# Patient Record
Sex: MALE | Race: WHITE | NOT HISPANIC OR LATINO
[De-identification: names, ages, dates, MRNs, and addresses within clinical notes are randomized per-mention and may not be internally consistent; named-entity substitution may affect disease eponyms.]

---

## 2018-02-20 ENCOUNTER — APPOINTMENT (OUTPATIENT)
Dept: UROLOGY | Facility: CLINIC | Age: 33
End: 2018-02-20
Payer: MEDICAID

## 2018-02-20 VITALS
HEART RATE: 86 BPM | HEIGHT: 69 IN | TEMPERATURE: 98.1 F | DIASTOLIC BLOOD PRESSURE: 76 MMHG | SYSTOLIC BLOOD PRESSURE: 123 MMHG | WEIGHT: 180 LBS | RESPIRATION RATE: 17 BRPM | BODY MASS INDEX: 26.66 KG/M2

## 2018-02-20 DIAGNOSIS — Z84.1 FAMILY HISTORY OF DISORDERS OF KIDNEY AND URETER: ICD-10-CM

## 2018-02-20 DIAGNOSIS — Z86.69 PERSONAL HISTORY OF OTHER DISEASES OF THE NERVOUS SYSTEM AND SENSE ORGANS: ICD-10-CM

## 2018-02-20 DIAGNOSIS — Z87.891 PERSONAL HISTORY OF NICOTINE DEPENDENCE: ICD-10-CM

## 2018-02-20 PROCEDURE — 99203 OFFICE O/P NEW LOW 30 MIN: CPT

## 2018-02-20 RX ORDER — OCRELIZUMAB 300 MG/10ML
300 INJECTION INTRAVENOUS
Refills: 0 | Status: ACTIVE | COMMUNITY

## 2018-03-13 ENCOUNTER — MESSAGE (OUTPATIENT)
Age: 33
End: 2018-03-13

## 2018-04-30 ENCOUNTER — APPOINTMENT (OUTPATIENT)
Dept: UROLOGY | Facility: HOSPITAL | Age: 33
End: 2018-04-30

## 2018-06-12 ENCOUNTER — MESSAGE (OUTPATIENT)
Age: 33
End: 2018-06-12

## 2018-06-21 ENCOUNTER — OTHER (OUTPATIENT)
Age: 33
End: 2018-06-21

## 2018-07-12 ENCOUNTER — MESSAGE (OUTPATIENT)
Age: 33
End: 2018-07-12

## 2018-07-30 ENCOUNTER — APPOINTMENT (OUTPATIENT)
Dept: UROLOGY | Facility: HOSPITAL | Age: 33
End: 2018-07-30

## 2018-07-31 ENCOUNTER — APPOINTMENT (OUTPATIENT)
Dept: INTERVENTIONAL RADIOLOGY/VASCULAR | Facility: CLINIC | Age: 33
End: 2018-07-31
Payer: MEDICAID

## 2018-07-31 ENCOUNTER — APPOINTMENT (OUTPATIENT)
Dept: UROLOGY | Facility: CLINIC | Age: 33
End: 2018-07-31
Payer: MEDICAID

## 2018-07-31 VITALS — BODY MASS INDEX: 26.5 KG/M2 | WEIGHT: 183 LBS | HEIGHT: 69.5 IN

## 2018-07-31 VITALS
HEIGHT: 69 IN | RESPIRATION RATE: 16 BRPM | DIASTOLIC BLOOD PRESSURE: 77 MMHG | SYSTOLIC BLOOD PRESSURE: 115 MMHG | HEART RATE: 77 BPM | OXYGEN SATURATION: 97 %

## 2018-07-31 PROCEDURE — 99214 OFFICE O/P EST MOD 30 MIN: CPT

## 2018-07-31 PROCEDURE — 99244 OFF/OP CNSLTJ NEW/EST MOD 40: CPT

## 2019-01-08 ENCOUNTER — APPOINTMENT (OUTPATIENT)
Dept: UROLOGY | Facility: CLINIC | Age: 34
End: 2019-01-08
Payer: SELF-PAY

## 2019-01-08 ENCOUNTER — OUTPATIENT (OUTPATIENT)
Dept: OUTPATIENT SERVICES | Facility: HOSPITAL | Age: 34
LOS: 1 days | End: 2019-01-08

## 2019-01-08 VITALS
HEART RATE: 73 BPM | DIASTOLIC BLOOD PRESSURE: 80 MMHG | TEMPERATURE: 97 F | HEIGHT: 68 IN | OXYGEN SATURATION: 97 % | WEIGHT: 184.97 LBS | SYSTOLIC BLOOD PRESSURE: 110 MMHG | RESPIRATION RATE: 18 BRPM

## 2019-01-08 DIAGNOSIS — N28.89 OTHER SPECIFIED DISORDERS OF KIDNEY AND URETER: ICD-10-CM

## 2019-01-08 DIAGNOSIS — Z00.00 ENCOUNTER FOR GENERAL ADULT MEDICAL EXAMINATION W/OUT ABNORMAL FINDINGS: ICD-10-CM

## 2019-01-08 DIAGNOSIS — Z98.890 OTHER SPECIFIED POSTPROCEDURAL STATES: Chronic | ICD-10-CM

## 2019-01-08 DIAGNOSIS — N28.9 DISORDER OF KIDNEY AND URETER, UNSPECIFIED: ICD-10-CM

## 2019-01-08 LAB
ANION GAP SERPL CALC-SCNC: 13 MEQ/L — SIGNIFICANT CHANGE UP (ref 7–14)
APPEARANCE UR: CLEAR — SIGNIFICANT CHANGE UP
BILIRUB UR-MCNC: NEGATIVE — SIGNIFICANT CHANGE UP
BLD GP AB SCN SERPL QL: NEGATIVE — SIGNIFICANT CHANGE UP
BLOOD UR QL VISUAL: NEGATIVE — SIGNIFICANT CHANGE UP
BUN SERPL-MCNC: 15 MG/DL — SIGNIFICANT CHANGE UP (ref 7–23)
CALCIUM SERPL-MCNC: 9.3 MG/DL — SIGNIFICANT CHANGE UP (ref 8.4–10.5)
CHLORIDE SERPL-SCNC: 100 MMOL/L — SIGNIFICANT CHANGE UP (ref 98–107)
CO2 SERPL-SCNC: 25 MMOL/L — SIGNIFICANT CHANGE UP (ref 22–31)
COLOR SPEC: SIGNIFICANT CHANGE UP
CREAT SERPL-MCNC: 0.89 MG/DL — SIGNIFICANT CHANGE UP (ref 0.5–1.3)
GLUCOSE SERPL-MCNC: 94 MG/DL — SIGNIFICANT CHANGE UP (ref 70–99)
GLUCOSE UR-MCNC: NEGATIVE — SIGNIFICANT CHANGE UP
HCT VFR BLD CALC: 43.2 % — SIGNIFICANT CHANGE UP (ref 39–50)
HGB BLD-MCNC: 14.8 G/DL — SIGNIFICANT CHANGE UP (ref 13–17)
KETONES UR-MCNC: NEGATIVE — SIGNIFICANT CHANGE UP
LEUKOCYTE ESTERASE UR-ACNC: NEGATIVE — SIGNIFICANT CHANGE UP
MCHC RBC-ENTMCNC: 29.3 PG — SIGNIFICANT CHANGE UP (ref 27–34)
MCHC RBC-ENTMCNC: 34.3 % — SIGNIFICANT CHANGE UP (ref 32–36)
MCV RBC AUTO: 85.5 FL — SIGNIFICANT CHANGE UP (ref 80–100)
NITRITE UR-MCNC: NEGATIVE — SIGNIFICANT CHANGE UP
NRBC # FLD: 0 K/UL — LOW (ref 25–125)
PH UR: 6 — SIGNIFICANT CHANGE UP (ref 5–8)
PLATELET # BLD AUTO: 286 K/UL — SIGNIFICANT CHANGE UP (ref 150–400)
PMV BLD: 10.6 FL — SIGNIFICANT CHANGE UP (ref 7–13)
POTASSIUM SERPL-MCNC: 3.4 MMOL/L — LOW (ref 3.5–5.3)
POTASSIUM SERPL-SCNC: 3.4 MMOL/L — LOW (ref 3.5–5.3)
PROT UR-MCNC: NEGATIVE — SIGNIFICANT CHANGE UP
RBC # BLD: 5.05 M/UL — SIGNIFICANT CHANGE UP (ref 4.2–5.8)
RBC # FLD: 12.9 % — SIGNIFICANT CHANGE UP (ref 10.3–14.5)
RH IG SCN BLD-IMP: NEGATIVE — SIGNIFICANT CHANGE UP
SODIUM SERPL-SCNC: 138 MMOL/L — SIGNIFICANT CHANGE UP (ref 135–145)
SP GR SPEC: 1.02 — SIGNIFICANT CHANGE UP (ref 1–1.04)
UROBILINOGEN FLD QL: NORMAL — SIGNIFICANT CHANGE UP
WBC # BLD: 7.01 K/UL — SIGNIFICANT CHANGE UP (ref 3.8–10.5)
WBC # FLD AUTO: 7.01 K/UL — SIGNIFICANT CHANGE UP (ref 3.8–10.5)

## 2019-01-08 PROCEDURE — 99214 OFFICE O/P EST MOD 30 MIN: CPT

## 2019-01-08 NOTE — H&P PST ADULT - GASTROINTESTINAL DETAILS
no rebound tenderness/no rigidity/no organomegaly/no masses palpable/no distention/bowel sounds normal/no bruit/no guarding/nontender/soft

## 2019-01-08 NOTE — H&P PST ADULT - PROBLEM SELECTOR PLAN 1
Left Laparoscopic Partial Nephrectomy, Possible Open, Possible Radical on 01/21/19.  Lab results pending.  Preop, famotidine and chlorhexidine instructions provided and questions addressed.

## 2019-01-08 NOTE — H&P PST ADULT - FAMILY HISTORY
Father  Still living? Yes, Estimated age: Age Unknown  Family history of cardiac disorder in father, Age at diagnosis: Age Unknown     Sibling  Still living? Yes, Estimated age: Age Unknown  Family history of diabetes mellitus in brother, Age at diagnosis: Age Unknown  Family history of hypertension in brother, Age at diagnosis: Age Unknown

## 2019-01-08 NOTE — H&P PST ADULT - HISTORY OF PRESENT ILLNESS
33 yr old male with recent dx of Multiple Sclerosis presents for preop evaluation with dx of Other Specified Disorders of Kidney and Ureter.  Pt went for work-up for MS and incidentally disorder was found on MRI.  Pt is now scheduled for Left Laparoscopic Partial Nephrectomy, Possible Open, Possible Radical on 01/21/19.

## 2019-01-08 NOTE — H&P PST ADULT - NEGATIVE OPHTHALMOLOGIC SYMPTOMS
no lacrimation L/no lacrimation R/no blurred vision L/no blurred vision R/no discharge R/no discharge L

## 2019-01-08 NOTE — ASSESSMENT
[FreeTextEntry1] : This is a 33 year old male with a left renal mass planned for partial nephrectomy on 1/21/18\par \par - pre-operative counseling completed\par - PST today\par - call with questions or concerns, will send reports from his neurologist regarding MRI results

## 2019-01-08 NOTE — H&P PST ADULT - NEGATIVE GASTROINTESTINAL SYMPTOMS
no nausea/no change in bowel habits/no melena/no hematochezia/no jaundice/no hiccoughs/no constipation/no vomiting/no abdominal pain/no steatorrhea/no diarrhea

## 2019-01-08 NOTE — HISTORY OF PRESENT ILLNESS
[FreeTextEntry1] : This is a 33 year old male with a 2.5 cm left renal mass presenting for pre-operative counseling prior to partial nephrectomy. \par \par In the interval, he has been treated with DMARD (Rituxamab) infusions for MS.\par \par NO fevers, chills, nausea, vomiting, or difficulty voiding.\par \par

## 2019-01-10 LAB
BACTERIA UR CULT: SIGNIFICANT CHANGE UP
SPECIMEN SOURCE: SIGNIFICANT CHANGE UP

## 2019-01-20 ENCOUNTER — TRANSCRIPTION ENCOUNTER (OUTPATIENT)
Age: 34
End: 2019-01-20

## 2019-01-21 ENCOUNTER — APPOINTMENT (OUTPATIENT)
Dept: UROLOGY | Facility: HOSPITAL | Age: 34
End: 2019-01-21

## 2019-01-21 ENCOUNTER — INPATIENT (INPATIENT)
Facility: HOSPITAL | Age: 34
LOS: 1 days | Discharge: HOME | End: 2019-01-23
Attending: UROLOGY | Admitting: UROLOGY
Payer: COMMERCIAL

## 2019-01-21 ENCOUNTER — RESULT REVIEW (OUTPATIENT)
Age: 34
End: 2019-01-21

## 2019-01-21 VITALS
HEART RATE: 80 BPM | OXYGEN SATURATION: 98 % | TEMPERATURE: 99 F | WEIGHT: 184.97 LBS | DIASTOLIC BLOOD PRESSURE: 69 MMHG | SYSTOLIC BLOOD PRESSURE: 125 MMHG | RESPIRATION RATE: 16 BRPM | HEIGHT: 68 IN

## 2019-01-21 DIAGNOSIS — N28.89 OTHER SPECIFIED DISORDERS OF KIDNEY AND URETER: ICD-10-CM

## 2019-01-21 DIAGNOSIS — Z98.890 OTHER SPECIFIED POSTPROCEDURAL STATES: Chronic | ICD-10-CM

## 2019-01-21 DIAGNOSIS — Z29.9 ENCOUNTER FOR PROPHYLACTIC MEASURES, UNSPECIFIED: ICD-10-CM

## 2019-01-21 DIAGNOSIS — F41.9 ANXIETY DISORDER, UNSPECIFIED: ICD-10-CM

## 2019-01-21 DIAGNOSIS — G35 MULTIPLE SCLEROSIS: ICD-10-CM

## 2019-01-21 LAB
ANION GAP SERPL CALC-SCNC: 12 MMO/L — SIGNIFICANT CHANGE UP (ref 7–14)
BASOPHILS # BLD AUTO: 0.05 K/UL — SIGNIFICANT CHANGE UP (ref 0–0.2)
BASOPHILS NFR BLD AUTO: 0.3 % — SIGNIFICANT CHANGE UP (ref 0–2)
BUN SERPL-MCNC: 9 MG/DL — SIGNIFICANT CHANGE UP (ref 7–23)
CALCIUM SERPL-MCNC: 9 MG/DL — SIGNIFICANT CHANGE UP (ref 8.4–10.5)
CHLORIDE SERPL-SCNC: 105 MMOL/L — SIGNIFICANT CHANGE UP (ref 98–107)
CO2 SERPL-SCNC: 24 MMOL/L — SIGNIFICANT CHANGE UP (ref 22–31)
CREAT SERPL-MCNC: 1.07 MG/DL — SIGNIFICANT CHANGE UP (ref 0.5–1.3)
EOSINOPHIL # BLD AUTO: 0.04 K/UL — SIGNIFICANT CHANGE UP (ref 0–0.5)
EOSINOPHIL NFR BLD AUTO: 0.2 % — SIGNIFICANT CHANGE UP (ref 0–6)
GLUCOSE SERPL-MCNC: 145 MG/DL — HIGH (ref 70–99)
HCT VFR BLD CALC: 41.8 % — SIGNIFICANT CHANGE UP (ref 39–50)
HGB BLD-MCNC: 14 G/DL — SIGNIFICANT CHANGE UP (ref 13–17)
IMM GRANULOCYTES NFR BLD AUTO: 0.5 % — SIGNIFICANT CHANGE UP (ref 0–1.5)
LYMPHOCYTES # BLD AUTO: 0.65 K/UL — LOW (ref 1–3.3)
LYMPHOCYTES # BLD AUTO: 4 % — LOW (ref 13–44)
MCHC RBC-ENTMCNC: 29 PG — SIGNIFICANT CHANGE UP (ref 27–34)
MCHC RBC-ENTMCNC: 33.5 % — SIGNIFICANT CHANGE UP (ref 32–36)
MCV RBC AUTO: 86.5 FL — SIGNIFICANT CHANGE UP (ref 80–100)
MONOCYTES # BLD AUTO: 0.41 K/UL — SIGNIFICANT CHANGE UP (ref 0–0.9)
MONOCYTES NFR BLD AUTO: 2.5 % — SIGNIFICANT CHANGE UP (ref 2–14)
NEUTROPHILS # BLD AUTO: 15.09 K/UL — HIGH (ref 1.8–7.4)
NEUTROPHILS NFR BLD AUTO: 92.5 % — HIGH (ref 43–77)
NRBC # FLD: 0 K/UL — LOW (ref 25–125)
PLATELET # BLD AUTO: 271 K/UL — SIGNIFICANT CHANGE UP (ref 150–400)
PMV BLD: 10.1 FL — SIGNIFICANT CHANGE UP (ref 7–13)
POTASSIUM BLDV-SCNC: 3.9 MMOL/L — SIGNIFICANT CHANGE UP (ref 3.4–4.5)
POTASSIUM SERPL-MCNC: 4.2 MMOL/L — SIGNIFICANT CHANGE UP (ref 3.5–5.3)
POTASSIUM SERPL-SCNC: 4.2 MMOL/L — SIGNIFICANT CHANGE UP (ref 3.5–5.3)
RBC # BLD: 4.83 M/UL — SIGNIFICANT CHANGE UP (ref 4.2–5.8)
RBC # FLD: 13.1 % — SIGNIFICANT CHANGE UP (ref 10.3–14.5)
RH IG SCN BLD-IMP: NEGATIVE — SIGNIFICANT CHANGE UP
SODIUM SERPL-SCNC: 141 MMOL/L — SIGNIFICANT CHANGE UP (ref 135–145)
WBC # BLD: 16.32 K/UL — HIGH (ref 3.8–10.5)
WBC # FLD AUTO: 16.32 K/UL — HIGH (ref 3.8–10.5)

## 2019-01-21 PROCEDURE — 88307 TISSUE EXAM BY PATHOLOGIST: CPT | Mod: 26

## 2019-01-21 PROCEDURE — 88312 SPECIAL STAINS GROUP 1: CPT | Mod: 26

## 2019-01-21 PROCEDURE — 88331 PATH CONSLTJ SURG 1 BLK 1SPC: CPT | Mod: 26

## 2019-01-21 PROCEDURE — 88305 TISSUE EXAM BY PATHOLOGIST: CPT | Mod: 26

## 2019-01-21 PROCEDURE — 99223 1ST HOSP IP/OBS HIGH 75: CPT

## 2019-01-21 PROCEDURE — 50543 LAPARO PARTIAL NEPHRECTOMY: CPT | Mod: LT

## 2019-01-21 PROCEDURE — 76998 US GUIDE INTRAOP: CPT | Mod: 26

## 2019-01-21 RX ORDER — SENNA PLUS 8.6 MG/1
2 TABLET ORAL AT BEDTIME
Qty: 0 | Refills: 0 | Status: DISCONTINUED | OUTPATIENT
Start: 2019-01-21 | End: 2019-01-23

## 2019-01-21 RX ORDER — HYDROMORPHONE HYDROCHLORIDE 2 MG/ML
0.5 INJECTION INTRAMUSCULAR; INTRAVENOUS; SUBCUTANEOUS
Qty: 0 | Refills: 0 | Status: DISCONTINUED | OUTPATIENT
Start: 2019-01-21 | End: 2019-01-21

## 2019-01-21 RX ORDER — OXYCODONE AND ACETAMINOPHEN 5; 325 MG/1; MG/1
1 TABLET ORAL EVERY 4 HOURS
Qty: 0 | Refills: 0 | Status: DISCONTINUED | OUTPATIENT
Start: 2019-01-21 | End: 2019-01-23

## 2019-01-21 RX ORDER — KETOROLAC TROMETHAMINE 30 MG/ML
15 SYRINGE (ML) INJECTION EVERY 6 HOURS
Qty: 0 | Refills: 0 | Status: DISCONTINUED | OUTPATIENT
Start: 2019-01-21 | End: 2019-01-23

## 2019-01-21 RX ORDER — OXYCODONE AND ACETAMINOPHEN 5; 325 MG/1; MG/1
2 TABLET ORAL EVERY 6 HOURS
Qty: 0 | Refills: 0 | Status: DISCONTINUED | OUTPATIENT
Start: 2019-01-21 | End: 2019-01-23

## 2019-01-21 RX ORDER — DOCUSATE SODIUM 100 MG
100 CAPSULE ORAL THREE TIMES A DAY
Qty: 0 | Refills: 0 | Status: DISCONTINUED | OUTPATIENT
Start: 2019-01-21 | End: 2019-01-23

## 2019-01-21 RX ORDER — SODIUM CHLORIDE 9 MG/ML
1000 INJECTION, SOLUTION INTRAVENOUS
Qty: 0 | Refills: 0 | Status: DISCONTINUED | OUTPATIENT
Start: 2019-01-21 | End: 2019-01-21

## 2019-01-21 RX ORDER — ONDANSETRON 8 MG/1
4 TABLET, FILM COATED ORAL ONCE
Qty: 0 | Refills: 0 | Status: DISCONTINUED | OUTPATIENT
Start: 2019-01-21 | End: 2019-01-21

## 2019-01-21 RX ORDER — OCRELIZUMAB 300 MG/10ML
0 INJECTION INTRAVENOUS
Qty: 0 | Refills: 0 | COMMUNITY

## 2019-01-21 RX ORDER — HEPARIN SODIUM 5000 [USP'U]/ML
5000 INJECTION INTRAVENOUS; SUBCUTANEOUS EVERY 8 HOURS
Qty: 0 | Refills: 0 | Status: DISCONTINUED | OUTPATIENT
Start: 2019-01-21 | End: 2019-01-23

## 2019-01-21 RX ORDER — SODIUM CHLORIDE 9 MG/ML
1000 INJECTION, SOLUTION INTRAVENOUS
Qty: 0 | Refills: 0 | Status: DISCONTINUED | OUTPATIENT
Start: 2019-01-21 | End: 2019-01-22

## 2019-01-21 RX ADMIN — HYDROMORPHONE HYDROCHLORIDE 0.5 MILLIGRAM(S): 2 INJECTION INTRAMUSCULAR; INTRAVENOUS; SUBCUTANEOUS at 15:00

## 2019-01-21 RX ADMIN — SODIUM CHLORIDE 125 MILLILITER(S): 9 INJECTION, SOLUTION INTRAVENOUS at 18:40

## 2019-01-21 RX ADMIN — Medication 15 MILLIGRAM(S): at 18:40

## 2019-01-21 RX ADMIN — OXYCODONE AND ACETAMINOPHEN 1 TABLET(S): 5; 325 TABLET ORAL at 23:55

## 2019-01-21 RX ADMIN — Medication 100 MILLIGRAM(S): at 21:45

## 2019-01-21 RX ADMIN — HEPARIN SODIUM 5000 UNIT(S): 5000 INJECTION INTRAVENOUS; SUBCUTANEOUS at 15:25

## 2019-01-21 RX ADMIN — HEPARIN SODIUM 5000 UNIT(S): 5000 INJECTION INTRAVENOUS; SUBCUTANEOUS at 21:45

## 2019-01-21 RX ADMIN — OXYCODONE AND ACETAMINOPHEN 1 TABLET(S): 5; 325 TABLET ORAL at 22:57

## 2019-01-21 RX ADMIN — HYDROMORPHONE HYDROCHLORIDE 0.5 MILLIGRAM(S): 2 INJECTION INTRAMUSCULAR; INTRAVENOUS; SUBCUTANEOUS at 14:47

## 2019-01-21 RX ADMIN — SENNA PLUS 2 TABLET(S): 8.6 TABLET ORAL at 21:45

## 2019-01-21 RX ADMIN — HYDROMORPHONE HYDROCHLORIDE 0.5 MILLIGRAM(S): 2 INJECTION INTRAMUSCULAR; INTRAVENOUS; SUBCUTANEOUS at 14:30

## 2019-01-21 NOTE — BRIEF OPERATIVE NOTE - CONDITION POST OP
Pt called by writer. Home phone is out of service. Pt is unavailable at work number. Pt called on cell phone. Mailbox is full, unable to leave message. Pantry message sent to pt to go to ER as directed as she will need IV antibiotics to avoid becoming septic. Message routed to Dr. Hernandez.    Stable

## 2019-01-21 NOTE — CONSULT NOTE ADULT - PROBLEM SELECTOR RECOMMENDATION 9
-s/p left lap partial nephrectomy 1/21  -postop management per , pain control, IVF, incentive spirometry, DVT ppx, on clears, await GI fxn

## 2019-01-21 NOTE — CONSULT NOTE ADULT - SUBJECTIVE AND OBJECTIVE BOX
Mehreen Elena MD  Pager [phone]    HPI:  33 yr old male with recent dx of anxiety, Multiple Sclerosis, L renal mass, admitted for surgery. Patient underwent left lap partial nephrectomy today. Currently in pacu, hemodynamically stable, c/o incisional pain and left shoulder discomfort.  Denies chest pain/sob.    PAST MEDICAL & SURGICAL HISTORY:  Anxiety  Disorder of kidney and ureter, unspecified  Multiple sclerosis  H/O inguinal hernia repair: @ 1yr old    Review of Systems:   CONSTITUTIONAL: No fever, weight loss, or fatigue  EYES: No eye pain, visual disturbances, or discharge  ENMT:  No difficulty hearing, tinnitus, vertigo; No sinus or throat pain  NECK: No pain or stiffness  BREASTS: No pain, masses, or nipple discharge  RESPIRATORY: No cough, wheezing, chills or hemoptysis; No shortness of breath  CARDIOVASCULAR: No chest pain, palpitations, dizziness, or leg swelling  GASTROINTESTINAL: No abdominal or epigastric pain. No nausea, vomiting, or hematemesis; No diarrhea or constipation. No melena or hematochezia.  GENITOURINARY: No dysuria, frequency, hematuria, or incontinence  NEUROLOGICAL: No headaches, memory loss, loss of strength, numbness, or tremors  SKIN: No itching, burning, rashes, or lesions   LYMPH NODES: No enlarged glands  ENDOCRINE: No heat or cold intolerance; No hair loss  MUSCULOSKELETAL: No joint pain or swelling; No muscle, back, or extremity pain  PSYCHIATRIC: No depression, anxiety, mood swings, or difficulty sleeping  HEME/LYMPH: No easy bruising, or bleeding gums  ALLERY AND IMMUNOLOGIC: No hives or eczema    Allergies    No Known Allergies    Intolerances      Social History:  former smoker 1  ppd x15 years, quit 2007, drinks wine 1-2 drinks on weekends    FAMILY HISTORY:  Family history of hypertension in brother (Sibling)  Family history of diabetes mellitus in brother (Sibling)  Family history of cardiac disorder in father (Father): arrythmia      Home Medications:  Ocrevus: every 6 months for MS (21 Jan 2019 10:01)  vitamin d: 1 tab(s) orally once a day (at bedtime) (21 Jan 2019 10:01)      MEDICATIONS  (STANDING):  heparin  Injectable 5000 Unit(s) SubCutaneous every 8 hours  ketorolac   Injectable 15 milliGRAM(s) IV Push every 6 hours  lactated ringers. 1000 milliLiter(s) (125 mL/Hr) IV Continuous <Continuous>    MEDICATIONS  (PRN):  HYDROmorphone  Injectable 0.5 milliGRAM(s) IV Push every 10 minutes PRN Severe Pain (7 - 10)  ondansetron  IVPB 4 milliGRAM(s) IV Intermittent once PRN Nausea and/or Vomiting  oxyCODONE    5 mG/acetaminophen 325 mG 1 Tablet(s) Oral every 4 hours PRN Moderate Pain (4 - 6)  oxyCODONE    5 mG/acetaminophen 325 mG 2 Tablet(s) Oral every 6 hours PRN Severe Pain (7 - 10)      Vital Signs Last 24 Hrs  T(C): 36.4 (21 Jan 2019 13:45), Max: 37.1 (21 Jan 2019 09:22)  T(F): 97.5 (21 Jan 2019 13:45), Max: 98.8 (21 Jan 2019 09:22)  HR: 80 (21 Jan 2019 14:30) (73 - 85)  BP: 99/84 (21 Jan 2019 14:30) (99/84 - 127/71)  BP(mean): 87 (21 Jan 2019 14:30) (59 - 87)  RR: 16 (21 Jan 2019 14:30) (10 - 21)  SpO2: 96% (21 Jan 2019 14:30) (95% - 99%)  CAPILLARY BLOOD GLUCOSE        I&O's Summary    21 Jan 2019 07:01  -  21 Jan 2019 14:39  --------------------------------------------------------  IN: 0 mL / OUT: 125 mL / NET: -125 mL        PHYSICAL EXAM:  GENERAL: NAD, well-developed  HEAD:  Atraumatic, Normocephalic  EYES: EOMI, PERRLA, conjunctiva and sclera clear  NECK: Supple, No JVD  CHEST/LUNG: Clear to auscultation bilaterally; No wheeze  HEART: Regular rate and rhythm; No murmurs, rubs, or gallops  ABDOMEN: Soft, incisional tenderness, nondistended  : house  EXTREMITIES:  2+ Peripheral Pulses, No clubbing, cyanosis, or edema  PSYCH: AAOx3  NEUROLOGY: non-focal  SKIN: No rashes or lesions    LABS:                  Microbiology     RADIOLOGY & ADDITIONAL TESTS:    Imaging Personally Reviewed:    Consultant(s) Notes Reviewed:      Care Discussed with Consultants/Other Providers:

## 2019-01-21 NOTE — CONSULT NOTE ADULT - PROBLEM SELECTOR RECOMMENDATION 3
-gets anti-CD20 monoclonal antibody (Ocrelizumab = Ocrevus) infusion every 6 months for primary progressive MS, f/u neurologist at [facility]  -stable, outpt f/u

## 2019-01-21 NOTE — BRIEF OPERATIVE NOTE - PROCEDURE
<<-----Click on this checkbox to enter Procedure Laparoscopic partial nephrectomy  01/21/2019  Left  Active  [id number]

## 2019-01-22 LAB
ANION GAP SERPL CALC-SCNC: 13 MMO/L — SIGNIFICANT CHANGE UP (ref 7–14)
BUN SERPL-MCNC: 10 MG/DL — SIGNIFICANT CHANGE UP (ref 7–23)
CALCIUM SERPL-MCNC: 8.8 MG/DL — SIGNIFICANT CHANGE UP (ref 8.4–10.5)
CHLORIDE SERPL-SCNC: 102 MMOL/L — SIGNIFICANT CHANGE UP (ref 98–107)
CO2 SERPL-SCNC: 25 MMOL/L — SIGNIFICANT CHANGE UP (ref 22–31)
CREAT SERPL-MCNC: 1.08 MG/DL — SIGNIFICANT CHANGE UP (ref 0.5–1.3)
GLUCOSE SERPL-MCNC: 116 MG/DL — HIGH (ref 70–99)
HCT VFR BLD CALC: 40.5 % — SIGNIFICANT CHANGE UP (ref 39–50)
HGB BLD-MCNC: 13.6 G/DL — SIGNIFICANT CHANGE UP (ref 13–17)
MCHC RBC-ENTMCNC: 28.7 PG — SIGNIFICANT CHANGE UP (ref 27–34)
MCHC RBC-ENTMCNC: 33.6 % — SIGNIFICANT CHANGE UP (ref 32–36)
MCV RBC AUTO: 85.4 FL — SIGNIFICANT CHANGE UP (ref 80–100)
NRBC # FLD: 0 K/UL — LOW (ref 25–125)
PLATELET # BLD AUTO: 290 K/UL — SIGNIFICANT CHANGE UP (ref 150–400)
PMV BLD: 10.3 FL — SIGNIFICANT CHANGE UP (ref 7–13)
POTASSIUM SERPL-MCNC: 3.9 MMOL/L — SIGNIFICANT CHANGE UP (ref 3.5–5.3)
POTASSIUM SERPL-SCNC: 3.9 MMOL/L — SIGNIFICANT CHANGE UP (ref 3.5–5.3)
RBC # BLD: 4.74 M/UL — SIGNIFICANT CHANGE UP (ref 4.2–5.8)
RBC # FLD: 13.1 % — SIGNIFICANT CHANGE UP (ref 10.3–14.5)
RH IG SCN BLD-IMP: NEGATIVE — SIGNIFICANT CHANGE UP
SODIUM SERPL-SCNC: 140 MMOL/L — SIGNIFICANT CHANGE UP (ref 135–145)
WBC # BLD: 13.75 K/UL — HIGH (ref 3.8–10.5)
WBC # FLD AUTO: 13.75 K/UL — HIGH (ref 3.8–10.5)

## 2019-01-22 PROCEDURE — 99233 SBSQ HOSP IP/OBS HIGH 50: CPT

## 2019-01-22 PROCEDURE — 93010 ELECTROCARDIOGRAM REPORT: CPT

## 2019-01-22 RX ORDER — PANTOPRAZOLE SODIUM 20 MG/1
40 TABLET, DELAYED RELEASE ORAL
Qty: 0 | Refills: 0 | Status: DISCONTINUED | OUTPATIENT
Start: 2019-01-22 | End: 2019-01-23

## 2019-01-22 RX ORDER — SODIUM CHLORIDE 9 MG/ML
1000 INJECTION, SOLUTION INTRAVENOUS
Qty: 0 | Refills: 0 | Status: DISCONTINUED | OUTPATIENT
Start: 2019-01-22 | End: 2019-01-23

## 2019-01-22 RX ADMIN — Medication 100 MILLIGRAM(S): at 22:28

## 2019-01-22 RX ADMIN — Medication 15 MILLIGRAM(S): at 18:26

## 2019-01-22 RX ADMIN — SENNA PLUS 2 TABLET(S): 8.6 TABLET ORAL at 22:28

## 2019-01-22 RX ADMIN — OXYCODONE AND ACETAMINOPHEN 1 TABLET(S): 5; 325 TABLET ORAL at 04:00

## 2019-01-22 RX ADMIN — Medication 15 MILLIGRAM(S): at 06:29

## 2019-01-22 RX ADMIN — HEPARIN SODIUM 5000 UNIT(S): 5000 INJECTION INTRAVENOUS; SUBCUTANEOUS at 06:30

## 2019-01-22 RX ADMIN — Medication 100 MILLIGRAM(S): at 13:14

## 2019-01-22 RX ADMIN — OXYCODONE AND ACETAMINOPHEN 1 TABLET(S): 5; 325 TABLET ORAL at 03:01

## 2019-01-22 RX ADMIN — PANTOPRAZOLE SODIUM 40 MILLIGRAM(S): 20 TABLET, DELAYED RELEASE ORAL at 12:45

## 2019-01-22 RX ADMIN — SODIUM CHLORIDE 125 MILLILITER(S): 9 INJECTION, SOLUTION INTRAVENOUS at 07:05

## 2019-01-22 RX ADMIN — Medication 10 MILLIGRAM(S): at 07:07

## 2019-01-22 RX ADMIN — Medication 15 MILLIGRAM(S): at 00:09

## 2019-01-22 RX ADMIN — HEPARIN SODIUM 5000 UNIT(S): 5000 INJECTION INTRAVENOUS; SUBCUTANEOUS at 22:28

## 2019-01-22 RX ADMIN — Medication 100 MILLIGRAM(S): at 06:29

## 2019-01-22 RX ADMIN — Medication 15 MILLIGRAM(S): at 12:45

## 2019-01-22 RX ADMIN — SODIUM CHLORIDE 75 MILLILITER(S): 9 INJECTION, SOLUTION INTRAVENOUS at 08:18

## 2019-01-22 RX ADMIN — HEPARIN SODIUM 5000 UNIT(S): 5000 INJECTION INTRAVENOUS; SUBCUTANEOUS at 13:14

## 2019-01-22 NOTE — PHYSICAL THERAPY INITIAL EVALUATION ADULT - ADDITIONAL COMMENTS
Patient reports he lives with his wife in a private house, 4-6 steps to enter and 1 flight within. Patient reports he was previously independent in all ADLs and did not use an assistive device for ambulation.     Patient was left sitting in chair as found, all lines/tubes intact and call rodriguez within reach, FELTON melendez

## 2019-01-22 NOTE — PHYSICAL THERAPY INITIAL EVALUATION ADULT - ASR EQUIP NEEDS DISCH PT EVAL
rolling walker (5 inch wheels)/Patient may benefit from a rolling walker for safe ambulation; patient "thinking about it". Discussed at length the benefits of using a rolling walker for safe ambulation upon d/c home.

## 2019-01-22 NOTE — CHART NOTE - NSCHARTNOTEFT_GEN_A_CORE
Pt re-evaluated at 12:30PM and at 4:00Pm. No more Chest pain/ pressure. Vitals stable.  EKG showed NSR, no ST, TW changes. Pt still anxious, but still refusing Xanax.   Will continue to monitor. Pt re-evaluated at 12:40PM and at 4:00Pm. No more Chest pain/ pressure. Vitals stable.  EKG showed NSR, no ST, TW changes. Pt still anxious, but still refusing Xanax.   Will continue to monitor.

## 2019-01-22 NOTE — PHYSICAL THERAPY INITIAL EVALUATION ADULT - PATIENT PROFILE REVIEW, REHAB EVAL
yes/PT orders received--> ambulate as tolerated. Consult with FELTON ADAIR-->pt OK to participate in PT evaluation.

## 2019-01-22 NOTE — PHYSICAL THERAPY INITIAL EVALUATION ADULT - GAIT DEVIATIONS NOTED, PT EVAL
decreased step length/decreased shanti/decreased velocity of limb motion/Patient noted with left LE footdrop/increased time in double stance/decreased stride length/footdrop

## 2019-01-22 NOTE — PHYSICAL THERAPY INITIAL EVALUATION ADULT - PERTINENT HX OF CURRENT PROBLEM, REHAB EVAL
Patient is a 33 year old male admitted to [facility] on 1/21 for preop evaluation with diagnosis of Other Specified Disorders of Kidney and Ureter. PMH includes: recent diagnosis of Multiple Sclerosis.

## 2019-01-23 ENCOUNTER — TRANSCRIPTION ENCOUNTER (OUTPATIENT)
Age: 34
End: 2019-01-23

## 2019-01-23 VITALS
RESPIRATION RATE: 17 BRPM | HEART RATE: 73 BPM | OXYGEN SATURATION: 100 % | DIASTOLIC BLOOD PRESSURE: 74 MMHG | TEMPERATURE: 98 F | SYSTOLIC BLOOD PRESSURE: 117 MMHG

## 2019-01-23 PROCEDURE — 99233 SBSQ HOSP IP/OBS HIGH 50: CPT

## 2019-01-23 RX ORDER — SENNA PLUS 8.6 MG/1
2 TABLET ORAL
Qty: 0 | Refills: 0 | COMMUNITY
Start: 2019-01-23

## 2019-01-23 RX ORDER — DOCUSATE SODIUM 100 MG
1 CAPSULE ORAL
Qty: 0 | Refills: 0 | COMMUNITY
Start: 2019-01-23

## 2019-01-23 RX ADMIN — Medication 15 MILLIGRAM(S): at 06:59

## 2019-01-23 RX ADMIN — Medication 15 MILLIGRAM(S): at 12:16

## 2019-01-23 RX ADMIN — Medication 15 MILLIGRAM(S): at 00:35

## 2019-01-23 RX ADMIN — PANTOPRAZOLE SODIUM 40 MILLIGRAM(S): 20 TABLET, DELAYED RELEASE ORAL at 06:59

## 2019-01-23 RX ADMIN — Medication 100 MILLIGRAM(S): at 06:59

## 2019-01-23 RX ADMIN — HEPARIN SODIUM 5000 UNIT(S): 5000 INJECTION INTRAVENOUS; SUBCUTANEOUS at 06:59

## 2019-01-23 NOTE — PROGRESS NOTE ADULT - SUBJECTIVE AND OBJECTIVE BOX
Patient is a 33y old  Male who presents with a chief complaint of left renal mass (22 Jan 2019 06:56)      SUBJECTIVE / OVERNIGHT EVENTS: C/O mild substernal chest pain/pressure, not radiating, no SOB. Pt admitted to be anxious due to his current medical condition. Pt reported similar episodes before when he started a new job and recent diagnosis of renal mass.  (+) flatus but no BM    MEDICATIONS  (STANDING):  dextrose 5% + sodium chloride 0.45%. 1000 milliLiter(s) (75 mL/Hr) IV Continuous <Continuous>  docusate sodium 100 milliGRAM(s) Oral three times a day  heparin  Injectable 5000 Unit(s) SubCutaneous every 8 hours  ketorolac   Injectable 15 milliGRAM(s) IV Push every 6 hours  senna 2 Tablet(s) Oral at bedtime    MEDICATIONS  (PRN):  oxyCODONE    5 mG/acetaminophen 325 mG 1 Tablet(s) Oral every 4 hours PRN Moderate Pain (4 - 6)  oxyCODONE    5 mG/acetaminophen 325 mG 2 Tablet(s) Oral every 6 hours PRN Severe Pain (7 - 10)      Vital Signs Last 24 Hrs  T(C): 36.9 (22 Jan 2019 10:32), Max: 37.7 (21 Jan 2019 21:32)  T(F): 98.5 (22 Jan 2019 10:32), Max: 99.9 (21 Jan 2019 21:32)  HR: 59 (22 Jan 2019 10:32) (59 - 87)  BP: 113/69 (22 Jan 2019 10:32) (99/84 - 127/71)  BP(mean): 79 (21 Jan 2019 15:30) (59 - 87)  RR: 18 (22 Jan 2019 10:32) (10 - 22)  SpO2: 100% (22 Jan 2019 10:32) (95% - 100%)  CAPILLARY BLOOD GLUCOSE        I&O's Summary    21 Jan 2019 07:01  -  22 Jan 2019 07:00  --------------------------------------------------------  IN: 0 mL / OUT: 1725 mL / NET: -1725 mL    22 Jan 2019 07:01  -  22 Jan 2019 11:47  --------------------------------------------------------  IN: 0 mL / OUT: 250 mL / NET: -250 mL        PHYSICAL EXAM:  GENERAL: NAD, well-developed  HEAD:  Atraumatic, Normocephalic  EYES: EOMI, PERRLA, conjunctiva and sclera clear  NECK: Supple, No JVD  CHEST/LUNG: Clear to auscultation bilaterally; No wheeze  HEART: Regular rate and rhythm; No murmurs, rubs, or gallops  ABDOMEN: Soft, Nontender, Nondistended; Bowel sounds present  EXTREMITIES:  2+ Peripheral Pulses, No clubbing, cyanosis, or edema  PSYCH: AAOx3  NEUROLOGY: non-focal  SKIN: No rashes or lesions    LABS:                        13.6   13.75 )-----------( 290      ( 22 Jan 2019 06:51 )             40.5     01-22    140  |  102  |  10  ----------------------------<  116<H>  3.9   |  25  |  1.08    Ca    8.8      22 Jan 2019 06:51                RADIOLOGY & ADDITIONAL TESTS:    Imaging Personally Reviewed:    Consultant(s) Notes Reviewed:      Care Discussed with Consultants/Other Providers:
ANESTHESIA POSTOP CHECK    33y Male POSTOP DAY 1 S/P     Vital Signs Last 24 Hrs  T(C): 37.1 (22 Jan 2019 06:27), Max: 37.7 (21 Jan 2019 21:32)  T(F): 98.7 (22 Jan 2019 06:27), Max: 99.9 (21 Jan 2019 21:32)  HR: 73 (22 Jan 2019 06:27) (68 - 87)  BP: 109/61 (22 Jan 2019 06:27) (99/84 - 127/71)  BP(mean): 79 (21 Jan 2019 15:30) (59 - 87)  RR: 18 (22 Jan 2019 06:27) (10 - 22)  SpO2: 100% (22 Jan 2019 06:27) (95% - 100%)  I&O's Summary    21 Jan 2019 07:01  -  22 Jan 2019 07:00  --------------------------------------------------------  IN: 0 mL / OUT: 1725 mL / NET: -1725 mL        [ X] NO APPARENT ANESTHESIA COMPLICATIONS      Comments: Very happy with anesthesia experience.
Patient is a 33y old  Male who presents with a chief complaint of left renal mass (23 Jan 2019 09:34)      SUBJECTIVE / OVERNIGHT EVENTS: No complaints today. (+) BM.    MEDICATIONS  (STANDING):  docusate sodium 100 milliGRAM(s) Oral three times a day  heparin  Injectable 5000 Unit(s) SubCutaneous every 8 hours  ketorolac   Injectable 15 milliGRAM(s) IV Push every 6 hours  pantoprazole    Tablet 40 milliGRAM(s) Oral before breakfast  senna 2 Tablet(s) Oral at bedtime    MEDICATIONS  (PRN):  oxyCODONE    5 mG/acetaminophen 325 mG 1 Tablet(s) Oral every 4 hours PRN Moderate Pain (4 - 6)  oxyCODONE    5 mG/acetaminophen 325 mG 2 Tablet(s) Oral every 6 hours PRN Severe Pain (7 - 10)      Vital Signs Last 24 Hrs  T(C): 36.8 (23 Jan 2019 10:05), Max: 37.6 (23 Jan 2019 06:37)  T(F): 98.2 (23 Jan 2019 10:05), Max: 99.6 (23 Jan 2019 06:37)  HR: 73 (23 Jan 2019 10:05) (63 - 83)  BP: 117/74 (23 Jan 2019 10:05) (101/51 - 121/85)  BP(mean): --  RR: 17 (23 Jan 2019 10:05) (17 - 18)  SpO2: 100% (23 Jan 2019 10:05) (97% - 100%)  CAPILLARY BLOOD GLUCOSE        I&O's Summary    22 Jan 2019 07:01  -  23 Jan 2019 07:00  --------------------------------------------------------  IN: 0 mL / OUT: 1850 mL / NET: -1850 mL    23 Jan 2019 07:01  -  23 Jan 2019 10:35  --------------------------------------------------------  IN: 0 mL / OUT: 100 mL / NET: -100 mL        PHYSICAL EXAM:  GENERAL: NAD, well-developed  HEAD:  Atraumatic, Normocephalic  EYES: EOMI, PERRLA, conjunctiva and sclera clear  NECK: Supple, No JVD  CHEST/LUNG: Clear to auscultation bilaterally; No wheeze  HEART: Regular rate and rhythm; No murmurs, rubs, or gallops  ABDOMEN: Soft, mildly tender, Nondistended; Bowel sounds present  EXTREMITIES:  2+ Peripheral Pulses, No clubbing, cyanosis, or edema  PSYCH: AAOx3, calm  NEUROLOGY: non-focal  SKIN: No rashes or lesions    LABS:                        13.6   13.75 )-----------( 290      ( 22 Jan 2019 06:51 )             40.5     01-22    140  |  102  |  10  ----------------------------<  116<H>  3.9   |  25  |  1.08    Ca    8.8      22 Jan 2019 06:51                RADIOLOGY & ADDITIONAL TESTS:    Imaging Personally Reviewed:    Consultant(s) Notes Reviewed:      Care Discussed with Consultants/Other Providers:
Subjective  Awaiting flatus  reporting intermittent gas pain overnight    Objective  Vital signs  T(F): , Max: 99.9 (01-21-19 @ 21:32)  HR: 73 (01-22-19 @ 06:27)  BP: 109/61 (01-22-19 @ 06:27)  SpO2: 100% (01-22-19 @ 06:27)  F 1000 -clear yellow        Gen: NAD  Abd: incisions c/d/i with steri-strips in place  : urine clear, house removed    Labs      01-21 @ 14:30    WBC 16.32 / Hct 41.8  / SCr 1.07
Subjective  No acute overnight events, passing flatus/BMs, pain controlled    Objective    Vital signs  T(F): , Max: 99.6 (01-23-19 @ 06:37)  HR: 83 (01-23-19 @ 06:37)  BP: 109/52 (01-23-19 @ 06:37)  SpO2: 98% (01-23-19 @ 06:37)  Void: 875 - clear yellow    Gen: NAD  Abd: incisions c/d/i with steri-strips in place  : bladder non-palpable    Labs      01-22 @ 06:51    WBC 13.75 / Hct 40.5  / SCr 1.08     01-21 @ 14:30    WBC 16.32 / Hct 41.8  / SCr 1.07

## 2019-01-23 NOTE — DISCHARGE NOTE ADULT - CARE PLAN
Principal Discharge DX:	Left renal mass  Goal:	partial neph  Assessment and plan of treatment:	as above; drink plenty of fluids; avoid constipation while taking narcotics with stool softener/fiber; call office for fever over 101/nausea/vomiting

## 2019-01-23 NOTE — DISCHARGE NOTE ADULT - HOSPITAL COURSE
Pt had L. lap partial neph 2 days ago; did well; has MS but ambulated to baseline; passed gas and rima reg diet; voiding well; home today

## 2019-01-23 NOTE — DISCHARGE NOTE ADULT - CARE PROVIDER_API CALL
Marek Ruiz), Urology  [address] [place] [address]  Phone: [phone]  Fax: [phone]

## 2019-01-23 NOTE — DISCHARGE NOTE ADULT - DURABLE MEDICAL EQUIPMENT AGENCY
Count includes the [facility] [place] - [phone]  The above company delivered a rolling walker to the patient in hospital on 1/23/19.

## 2019-01-23 NOTE — DISCHARGE NOTE ADULT - CARE PROVIDERS DIRECT ADDRESSES
[email or web address]

## 2019-01-23 NOTE — DISCHARGE NOTE ADULT - NS AS DC FOLLOWUP STROKE INST
partial nephrectomy, percocet/Smoking Cessation/Influenza vaccination (VIS Pub Date: August 7, 2015) partial nephrectomy, percocet/Influenza vaccination (VIS Pub Date: August 7, 2015)

## 2019-01-23 NOTE — DISCHARGE NOTE ADULT - MEDICATION SUMMARY - MEDICATIONS TO TAKE
I will START or STAY ON the medications listed below when I get home from the hospital:    vitamin d  -- 1 tab(s) by mouth once a day (at bedtime)  -- Indication: For Home med    oxycodone-acetaminophen 5 mg-325 mg oral tablet  -- 1-2 tab(s) by mouth every 6 hours, As needed, pain MDD:8  -- Indication: For Pain if needed    Ocrevus  -- every 6 months for MS  -- Indication: For Home med    docusate sodium 100 mg oral capsule  -- 1 cap(s) by mouth 3 times a day  -- Indication: For OtC if needed to prevent constipation    senna oral tablet  -- 2 tab(s) by mouth once a day (at bedtime)  -- Indication: For same

## 2019-01-23 NOTE — PROGRESS NOTE ADULT - ASSESSMENT
34 y/o male with h/o MS, anxiety, L renal mass, s/p left lap partial nephrectomy 1/21, POD#1
32 y/o male with h/o MS, anxiety, L renal mass, s/p left lap partial nephrectomy 1/21, POD#2
This is a 33 year old male POD #1 s/p left laparoscopic partial nephrectomy    -AM Labs  - CLD, IVF  - pain control: tylenol, toradol, oxycodone  - OOB/Amb, IS  - Dulcolax  - TOV, PVR  - PT eval for MS
This is a 33 year old male POD #2 s/p left laparoscopic partial nephrectomy      - Reg  - pain control: tylenol, toradol, oxycodone  - OOB/Amb, IS  - Dulcolax  - PT eval for MS

## 2019-01-23 NOTE — PROGRESS NOTE ADULT - PROBLEM SELECTOR PLAN 2
Etiology not clear, atypical chest pain. (+) similar episodes before due to anxiety.  ? due to anxiety or refluxes.  -Stat EKG, vital signs  -PPI Protonix 40mg  IVP X1   -Ativan PRN for anxiety, pt declined ativan at this time.  -Monitor closely  -Consider High sensitivity trop if Chest pain/pressure persist.
Resolved. Likely due to anxiety.   -Pt instructed to f/u with his PCP for care of anxiety, he understood and agreed with the plan.

## 2019-01-23 NOTE — DISCHARGE NOTE ADULT - CONDITIONS AT DISCHARGE
Pt. is afebrile and offers no complaints. In no acute distress. Abdominal scope sites with steris open to air: clean, dry and intact. Pt is ambulating, tolerating diet well, and voiding in adequate amounts.

## 2019-01-23 NOTE — DISCHARGE NOTE ADULT - PATIENT PORTAL LINK FT
You can access the [facility] Patient Portal, offered by [facility], by registering with the following website: [email or web address]

## 2019-01-23 NOTE — DISCHARGE NOTE ADULT - PLAN OF CARE
partial neph as above; drink plenty of fluids; avoid constipation while taking narcotics with stool softener/fiber; call office for fever over 101/nausea/vomiting

## 2019-01-23 NOTE — DISCHARGE NOTE ADULT - INSTRUCTIONS
Make a follow up appointment with Dr. Ruiz. Call MD if you develop a fever, or if there is redness, swelling, drainage or pain not relieved by pain medication. No heavy lifting, bending, or straining to move your bowels. Take over the counter stool softeners as needed to prevent constipation which may be caused by pain medication. Drink plenty of liquids. as tolerated

## 2019-01-23 NOTE — PROGRESS NOTE ADULT - PROBLEM SELECTOR PLAN 1
S/P partial nephrectomy, POD#1.  -Management as per   -DVT prophylaxis  -Pain control  -Bowel regimen  -Incentive spirometer  -OOB and Ambulate
S/P partial nephrectomy, POD#2, doing well  -Management as per   -DVT prophylaxis  -Pain control  -Bowel regimen  -Incentive spirometer  -OOB and Ambulate

## 2019-01-23 NOTE — PROGRESS NOTE ADULT - ATTENDING COMMENTS
PT seen and examined agree with assessment and plan
Spoke to pt at length about f/u issues, he understood and agreed with the plan.

## 2019-01-24 PROBLEM — F41.9 ANXIETY DISORDER, UNSPECIFIED: Chronic | Status: ACTIVE | Noted: 2019-01-08

## 2019-01-24 PROBLEM — G35 MULTIPLE SCLEROSIS: Chronic | Status: ACTIVE | Noted: 2019-01-08

## 2019-01-24 PROBLEM — N28.9 DISORDER OF KIDNEY AND URETER, UNSPECIFIED: Chronic | Status: ACTIVE | Noted: 2019-01-08

## 2019-01-25 LAB — SURGICAL PATHOLOGY STUDY: SIGNIFICANT CHANGE UP

## 2019-02-26 ENCOUNTER — APPOINTMENT (OUTPATIENT)
Dept: UROLOGY | Facility: CLINIC | Age: 34
End: 2019-02-26
Payer: SELF-PAY

## 2019-02-26 PROCEDURE — 99024 POSTOP FOLLOW-UP VISIT: CPT

## 2019-02-26 NOTE — ASSESSMENT
[FreeTextEntry1] : This is 32 y/o M with MS s/p L partial nephrectomy for AML on 1/21/19.  He has known contralateral small AMLs as well.\par \par - follow up in 6 months with repeat US\par - cleared for lifting next week\par - cleared for intercourse in 3 more weeks\par - OK to resume treatments for MS\par - We discussed potentially starting anticholinergics and referral to Dr. Carpenter for neurourologic conditions.

## 2019-02-26 NOTE — HISTORY OF PRESENT ILLNESS
[FreeTextEntry1] : This is a 33 year old male presenting 5 weeks s/p a left partial nephrectomy for AML (3cm) on 1/21/19.  He had a smooth post-operative recovery and denies any difficulty voiding or gross hematuria.\par \par He is pain controlled and passing soft stools.  He was concerned about ? tinea cruris but this dissipated without topical treatment.\par \par He is also having symptoms of urinary frequency and urgency with urgency incontinence, in the setting of negative UCx, possibly secondary to MS.

## 2019-02-26 NOTE — PHYSICAL EXAM
[General Appearance - Well Developed] : well developed [General Appearance - Well Nourished] : well nourished [Normal Appearance] : normal appearance [Well Groomed] : well groomed [General Appearance - In No Acute Distress] : no acute distress [Bowel Sounds] : normal bowel sounds [Abdomen Soft] : soft [Abdomen Tenderness] : non-tender [Abdomen Mass (___ Cm)] : no abdominal mass palpated [Abdomen Hernia] : no hernia was discovered [Costovertebral Angle Tenderness] : no ~M costovertebral angle tenderness [Heart Rate And Rhythm] : Heart rate and rhythm were normal [Edema] : no peripheral edema [] : no respiratory distress [Exaggerated Use Of Accessory Muscles For Inspiration] : no accessory muscle use [Oriented To Time, Place, And Person] : oriented to person, place, and time [Affect] : the affect was normal [Mood] : the mood was normal [Not Anxious] : not anxious [Normal Station and Gait] : the gait and station were normal for the patient's age [No Focal Deficits] : no focal deficits [No Palpable Adenopathy] : no palpable adenopathy [FreeTextEntry1] : incisions well-healed

## 2019-08-15 ENCOUNTER — MESSAGE (OUTPATIENT)
Age: 34
End: 2019-08-15

## 2019-08-20 ENCOUNTER — APPOINTMENT (OUTPATIENT)
Dept: UROLOGY | Facility: CLINIC | Age: 34
End: 2019-08-20

## 2019-09-17 ENCOUNTER — APPOINTMENT (OUTPATIENT)
Dept: UROLOGY | Facility: CLINIC | Age: 34
End: 2019-09-17

## 2019-11-12 ENCOUNTER — APPOINTMENT (OUTPATIENT)
Dept: UROLOGY | Facility: CLINIC | Age: 34
End: 2019-11-12
Payer: COMMERCIAL

## 2019-11-12 DIAGNOSIS — D17.71 BENIGN LIPOMATOUS NEOPLASM OF KIDNEY: ICD-10-CM

## 2019-11-12 PROCEDURE — 99213 OFFICE O/P EST LOW 20 MIN: CPT

## 2023-02-03 NOTE — H&P PST ADULT - CARDIOVASCULAR
details… detailed exam Zoryve Counseling:  I discussed with the patient that Zoryve is not for use in the eyes, mouth or vagina. The most commonly reported side effects include diarrhea, headache, insomnia, application site pain, upper respiratory tract infections, and urinary tract infections.  All of the patient's questions and concerns were addressed.

## 2023-02-07 NOTE — DISCHARGE NOTE ADULT - INFLUENZA IMMUNIZATION DATE (APPROXIMATE):
[facility] PROGRESS NOTE        Subjective     Harjeet is a 72 year old with PMhx of HLD, CAD, HTN, type 2 DM, GERD, BPH, anxiety here for Eye Problem (Double vision, eyes feel tired)     Last seen 1/25/2023    Says has had some GI issues with rx from derm    Today pt complains of problems with eye sight  Says this weekend was driving and was tired but started seeing double vision.  Feels like he has sand in eyes  Will close one eye and doesn't seem much different between the two  Only had double vision that one day.  But still feels like something is in his eye  And feels tired a lot    Still has a lot of congestion and pressure- thought he could work out his sinus infection the last few weeks  Has had a lot of pressure and congestion for the past month.  Has appt with Dr. Winslow for ophthalmology 5/18/2023    No eye pain.    No ear pain    Pressure above eyes  A little dizzy    Has some tingling in his lips like if he ate a pepper or something spicy.      Social Determinants Former Smoker    Objective   Vitals:    02/07/23 1250   BP: 120/70   Pulse: 86   Resp: 16   Temp: 98 °F (36.7 °C)   TempSrc: Temporal   Weight: 88.5 kg (195 lb 1.6 oz)   Height: 5' 7\" (1.702 m)     Physical Exam  Constitutional:       Appearance: Normal appearance.   HENT:      Head: Normocephalic and atraumatic.      Right Ear: Tympanic membrane, ear canal and external ear normal.      Left Ear: Tympanic membrane, ear canal and external ear normal.      Nose: Congestion present.      Right Sinus: Frontal sinus tenderness present.      Left Sinus: Frontal sinus tenderness present.      Mouth/Throat:      Mouth: Mucous membranes are moist.   Eyes:      Extraocular Movements: Extraocular movements intact.      Conjunctiva/sclera: Conjunctivae normal.      Pupils: Pupils are equal, round, and reactive to light.   Cardiovascular:      Rate and Rhythm: Normal rate and regular rhythm.      Heart sounds: Normal heart sounds. No murmur heard.  Pulmonary:       Effort: Pulmonary effort is normal. No respiratory distress.      Breath sounds: Normal breath sounds. No stridor. No wheezing, rhonchi or rales.   Musculoskeletal:      Right lower leg: No edema.      Left lower leg: No edema.   Neurological:      Mental Status: He is alert.   Psychiatric:         Mood and Affect: Mood normal.         Behavior: Behavior normal.         Thought Content: Thought content normal.         Judgment: Judgment normal.                ASSESSMENT AND PLAN       1. Acute recurrent frontal sinusitis  Comments:  has had symptoms for several weeks, not sure if is affecting vision but can at least treat sinus infection  Orders:  -     amoxicillin-clavulanate (Augmentin) 875-125 MG per tablet; Take 1 tablet by mouth every 12 hours for 10 days. Take with food.  2. Double vision  Comments:  resolved, continue to monitor, check labs- needs to see eye doctor, has appt in may but may need to be seen sooner  Orders:  -     THYROID STIMULATING HORMONE; Future  -     CBC WITH DIFFERENTIAL; Future  -     COMPREHENSIVE METABOLIC PANEL; Future  -     VITAMIN D -25 HYDROXY; Future  -     VITAMIN B12; Future  3. Other fatigue  Comments:  check blood work, likely multifactorial    Orders:  -     THYROID STIMULATING HORMONE; Future  -     CBC WITH DIFFERENTIAL; Future  -     COMPREHENSIVE METABOLIC PANEL; Future  -     VITAMIN D -25 HYDROXY; Future  -     VITAMIN B12; Future  4. Type 2 diabetes mellitus with hyperglycemia, without long-term current use of insulin (CMS/[facility])  -     THYROID STIMULATING HORMONE; Future  -     CBC WITH DIFFERENTIAL; Future  -     COMPREHENSIVE METABOLIC PANEL; Future  -     VITAMIN D -25 HYDROXY; Future  -     VITAMIN B12; Future  -     GLYCOHEMOGLOBIN; Future  5. Vitamin D deficiency  -     VITAMIN D -25 HYDROXY; Future      Follow Up             21-Sep-2019

## 2023-05-02 NOTE — PATIENT PROFILE ADULT - INFLUENZA IMMUNIZATION DATE (APPROXIMATE)
The following changes were made to your medications today:   Continue all your current medications.    The following lifestyle modifications are encouraged:  Continue regular exercise at least 30 minutes daily.  Lowfat/Low Cholesterol diet advised.   Low sodium diet <2000mgs/day and fluid restrictions of <2liters/day (640z/day or 6-8 cups 8oz liquids per day).  Daily weight checks and call if you notice weight gain of 3lbs in a day or 5lbs in a week.    The following tests have been ordered:  None     Follow up in CHF clinic in 2-3 months.  Follow up with primary cardiologist,  in 1 month.    Additional Educational Resources:  For additional resources regarding your symptoms, diagnosis, or further health information, please visit the Health Resources section on [email or web address] or the Online Health Resources section in [employer].      
21-Sep-2019